# Patient Record
Sex: FEMALE | Race: WHITE | URBAN - METROPOLITAN AREA
[De-identification: names, ages, dates, MRNs, and addresses within clinical notes are randomized per-mention and may not be internally consistent; named-entity substitution may affect disease eponyms.]

---

## 2020-07-25 ENCOUNTER — TELEPHONE ENCOUNTER (OUTPATIENT)
Dept: URBAN - METROPOLITAN AREA CLINIC 13 | Facility: CLINIC | Age: 55
End: 2020-07-25

## 2020-07-26 ENCOUNTER — TELEPHONE ENCOUNTER (OUTPATIENT)
Dept: URBAN - METROPOLITAN AREA CLINIC 13 | Facility: CLINIC | Age: 55
End: 2020-07-26

## 2023-01-11 ENCOUNTER — OFFICE VISIT (OUTPATIENT)
Dept: URBAN - METROPOLITAN AREA CLINIC 113 | Facility: CLINIC | Age: 58
End: 2023-01-11
Payer: COMMERCIAL

## 2023-01-11 ENCOUNTER — LAB OUTSIDE AN ENCOUNTER (OUTPATIENT)
Dept: URBAN - METROPOLITAN AREA CLINIC 113 | Facility: CLINIC | Age: 58
End: 2023-01-11

## 2023-01-11 VITALS
SYSTOLIC BLOOD PRESSURE: 130 MMHG | RESPIRATION RATE: 18 BRPM | HEART RATE: 68 BPM | DIASTOLIC BLOOD PRESSURE: 75 MMHG | WEIGHT: 233.4 LBS | HEIGHT: 68 IN | TEMPERATURE: 97.7 F | BODY MASS INDEX: 35.37 KG/M2

## 2023-01-11 DIAGNOSIS — K21.9 GASTROESOPHAGEAL REFLUX DISEASE WITHOUT ESOPHAGITIS: ICD-10-CM

## 2023-01-11 DIAGNOSIS — Z12.11 COLON CANCER SCREENING: ICD-10-CM

## 2023-01-11 DIAGNOSIS — R10.32 LLQ ABDOMINAL PAIN: ICD-10-CM

## 2023-01-11 PROBLEM — 301716002: Status: ACTIVE | Noted: 2023-01-11

## 2023-01-11 PROCEDURE — 99204 OFFICE O/P NEW MOD 45 MIN: CPT | Performed by: INTERNAL MEDICINE

## 2023-01-11 RX ORDER — FAMOTIDINE, CALCIUM CARBONATE, AND MAGNESIUM HYDROXIDE 10; 800; 165 MG/1; MG/1; MG/1
1 TAB TABLET, CHEWABLE ORAL ONCE A DAY
Status: ACTIVE | COMMUNITY

## 2023-01-11 RX ORDER — FUROSEMIDE 20 MG/1
1 TABLET TABLET ORAL ONCE A DAY
Status: ACTIVE | COMMUNITY

## 2023-01-11 RX ORDER — ESCITALOPRAM OXALATE 10 MG/1
1.5 TABLET TABLET, FILM COATED ORAL ONCE A DAY
Status: ACTIVE | COMMUNITY

## 2023-01-11 RX ORDER — LEVOTHYROXINE SODIUM 150 UG/1
1 TABLET IN THE MORNING ON AN EMPTY STOMACH TABLET ORAL ONCE A DAY
Status: ACTIVE | COMMUNITY

## 2023-01-11 RX ORDER — ONDANSETRON 4 MG/1
1 TABLET TABLET, ORALLY DISINTEGRATING ORAL
Qty: 6 | Refills: 0 | OUTPATIENT
Start: 2023-01-11

## 2023-01-11 RX ORDER — ESOMEPRAZOLE MAGNESIUM 40 MG/1
1 CAPSULE CAPSULE, DELAYED RELEASE ORAL
Qty: 30 | Refills: 6 | OUTPATIENT

## 2023-01-11 RX ORDER — DULAGLUTIDE 0.75 MG/.5ML
AS DIRECTED INJECTION, SOLUTION SUBCUTANEOUS
Status: ACTIVE | COMMUNITY

## 2023-01-11 RX ORDER — SIMVASTATIN 40 MG
2 TABLET IN THE EVENING TABLET ORAL ONCE A DAY
Status: ACTIVE | COMMUNITY

## 2023-01-11 NOTE — PHYSICAL EXAM NECK/THYROID:
normal appearance , without tenderness upon palpation , no deformities , trachea midline , Thyroid normal size , no thyroid nodules , no masses no injection or discharge

## 2023-01-11 NOTE — HPI-TODAY'S VISIT:
This is a 57-year-old female with a history of hypertension, hyperlipidemia, hyperparathyroidism, and anxiety referred from Dr. Garcia for evaluation of abdominal pain and for colon cancer screening. She reports a 1 year history of pain located in the left lower quadrant near her groin.  She describes a cramp or pulling sensation or tightness.  It occurs at random unassociated with meals, defecation, or position.  She may wake at night with an episode of pain.  It typically lasts less than 1 minute.  It rarely occurs more than once in 24 hours.  Over time, it has improved decreasing in frequency.  Her GYN did a hysteroscopy due to abnormal uterine bleeding.  He removed polyps.  Her pain has been persistent.  She has not had abdominal imaging. She has a daily bowel movement.  She denies red blood per rectum or melena.  She has a history of acid reflux.  For the last month, she has experienced consistent nocturnal acid reflux.  On 1 occasion, she waking at night with fluid regurgitating into her throat.  She felt as though she may choke and was having difficulty breathing.  This represents an isolated event.  She does use Nexium over-the-counter in the past and will occasionally take Tums at night.  She takes Pepcid daily with Allegra recommended by her allergist for itching.  She denies dysphagia.  Since a thyroidectomy, if she gets up at night to drink liquids and tries to swallow, it occasionally "sprays out of my throat."  She denies symptoms associated with eating meals. She denies a family history of GI cancers.  She has not had a prior EGD or colonoscopy. 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

## 2023-03-02 PROBLEM — 305058001: Status: ACTIVE | Noted: 2023-01-11

## 2023-03-03 PROBLEM — 266435005: Status: ACTIVE | Noted: 2023-01-11

## 2023-03-17 ENCOUNTER — OFFICE VISIT (OUTPATIENT)
Dept: URBAN - METROPOLITAN AREA SURGERY CENTER 25 | Facility: SURGERY CENTER | Age: 58
End: 2023-03-17
Payer: COMMERCIAL

## 2023-03-17 DIAGNOSIS — K29.60 OTHER GASTRITIS WITHOUT BLEEDING: ICD-10-CM

## 2023-03-17 DIAGNOSIS — K20.90 CHRONIC ESOPHAGITIS: ICD-10-CM

## 2023-03-17 DIAGNOSIS — B96.81 H PYLORI +: ICD-10-CM

## 2023-03-17 PROCEDURE — G8907 PT DOC NO EVENTS ON DISCHARG: HCPCS | Performed by: INTERNAL MEDICINE

## 2023-03-17 PROCEDURE — 43239 EGD BIOPSY SINGLE/MULTIPLE: CPT | Performed by: INTERNAL MEDICINE

## 2023-03-17 RX ORDER — ESOMEPRAZOLE MAGNESIUM 40 MG/1
1 CAPSULE CAPSULE, DELAYED RELEASE ORAL
Qty: 30 | Refills: 6 | Status: ACTIVE | COMMUNITY

## 2023-03-17 RX ORDER — DULAGLUTIDE 0.75 MG/.5ML
AS DIRECTED INJECTION, SOLUTION SUBCUTANEOUS
Status: ACTIVE | COMMUNITY

## 2023-03-17 RX ORDER — ONDANSETRON 4 MG/1
1 TABLET TABLET, ORALLY DISINTEGRATING ORAL
Qty: 6 | Refills: 0 | Status: ACTIVE | COMMUNITY
Start: 2023-01-11

## 2023-03-17 RX ORDER — SIMVASTATIN 40 MG
2 TABLET IN THE EVENING TABLET ORAL ONCE A DAY
Status: ACTIVE | COMMUNITY

## 2023-03-17 RX ORDER — LEVOTHYROXINE SODIUM 150 UG/1
1 TABLET IN THE MORNING ON AN EMPTY STOMACH TABLET ORAL ONCE A DAY
Status: ACTIVE | COMMUNITY

## 2023-03-17 RX ORDER — FUROSEMIDE 20 MG/1
1 TABLET TABLET ORAL ONCE A DAY
Status: ACTIVE | COMMUNITY

## 2023-03-17 RX ORDER — ESCITALOPRAM OXALATE 10 MG/1
1.5 TABLET TABLET, FILM COATED ORAL ONCE A DAY
Status: ACTIVE | COMMUNITY

## 2023-03-17 RX ORDER — FAMOTIDINE, CALCIUM CARBONATE, AND MAGNESIUM HYDROXIDE 10; 800; 165 MG/1; MG/1; MG/1
1 TAB TABLET, CHEWABLE ORAL ONCE A DAY
Status: ACTIVE | COMMUNITY

## 2023-04-13 ENCOUNTER — OFFICE VISIT (OUTPATIENT)
Dept: URBAN - METROPOLITAN AREA SURGERY CENTER 25 | Facility: SURGERY CENTER | Age: 58
End: 2023-04-13
Payer: COMMERCIAL

## 2023-04-13 DIAGNOSIS — Z12.11 COLON CANCER SCREENING: ICD-10-CM

## 2023-04-13 DIAGNOSIS — D12.3 ADENOMA OF TRANSVERSE COLON: ICD-10-CM

## 2023-04-13 DIAGNOSIS — D12.4 ADENOMA OF DESCENDING COLON: ICD-10-CM

## 2023-04-13 PROCEDURE — 45385 COLONOSCOPY W/LESION REMOVAL: CPT | Performed by: INTERNAL MEDICINE

## 2023-04-13 PROCEDURE — G8907 PT DOC NO EVENTS ON DISCHARG: HCPCS | Performed by: INTERNAL MEDICINE

## 2023-04-13 RX ORDER — SIMVASTATIN 40 MG
2 TABLET IN THE EVENING TABLET ORAL ONCE A DAY
Status: ACTIVE | COMMUNITY

## 2023-04-13 RX ORDER — FAMOTIDINE, CALCIUM CARBONATE, AND MAGNESIUM HYDROXIDE 10; 800; 165 MG/1; MG/1; MG/1
1 TAB TABLET, CHEWABLE ORAL ONCE A DAY
Status: ACTIVE | COMMUNITY

## 2023-04-13 RX ORDER — ESOMEPRAZOLE MAGNESIUM 40 MG/1
1 CAPSULE CAPSULE, DELAYED RELEASE ORAL
Qty: 30 | Refills: 6 | Status: ACTIVE | COMMUNITY

## 2023-04-13 RX ORDER — FUROSEMIDE 20 MG/1
1 TABLET TABLET ORAL ONCE A DAY
Status: ACTIVE | COMMUNITY

## 2023-04-13 RX ORDER — ESCITALOPRAM OXALATE 10 MG/1
1.5 TABLET TABLET, FILM COATED ORAL ONCE A DAY
Status: ACTIVE | COMMUNITY

## 2023-04-13 RX ORDER — ONDANSETRON 4 MG/1
1 TABLET TABLET, ORALLY DISINTEGRATING ORAL
Qty: 6 | Refills: 0 | Status: ACTIVE | COMMUNITY
Start: 2023-01-11

## 2023-04-13 RX ORDER — DULAGLUTIDE 0.75 MG/.5ML
AS DIRECTED INJECTION, SOLUTION SUBCUTANEOUS
Status: ACTIVE | COMMUNITY

## 2023-04-13 RX ORDER — LEVOTHYROXINE SODIUM 150 UG/1
1 TABLET IN THE MORNING ON AN EMPTY STOMACH TABLET ORAL ONCE A DAY
Status: ACTIVE | COMMUNITY

## 2023-04-14 ENCOUNTER — TELEPHONE ENCOUNTER (OUTPATIENT)
Dept: URBAN - METROPOLITAN AREA CLINIC 113 | Facility: CLINIC | Age: 58
End: 2023-04-14

## 2023-04-14 RX ORDER — METRONIDAZOLE 500 MG/1
1 TABLET TABLET ORAL TWICE A DAY
Qty: 28 TABLET | Refills: 0 | OUTPATIENT
Start: 2023-04-14 | End: 2023-04-28

## 2023-04-14 RX ORDER — PANTOPRAZOLE SODIUM 40 MG/1
1 TABLET TABLET, DELAYED RELEASE ORAL TWICE A DAY
Qty: 28 TABLET | Refills: 0 | OUTPATIENT
Start: 2023-04-14

## 2023-04-14 RX ORDER — BISMUTH SUBSALICYLATE 262 MG/1
1 TABLET TABLET, CHEWABLE ORAL TWICE A DAY
Qty: 28 TABLET | Refills: 0 | OUTPATIENT
Start: 2023-04-14 | End: 2023-04-28

## 2023-05-31 ENCOUNTER — DASHBOARD ENCOUNTERS (OUTPATIENT)
Age: 58
End: 2023-05-31

## 2023-05-31 ENCOUNTER — OFFICE VISIT (OUTPATIENT)
Dept: URBAN - METROPOLITAN AREA CLINIC 113 | Facility: CLINIC | Age: 58
End: 2023-05-31
Payer: COMMERCIAL

## 2023-05-31 VITALS
WEIGHT: 230 LBS | DIASTOLIC BLOOD PRESSURE: 85 MMHG | SYSTOLIC BLOOD PRESSURE: 131 MMHG | HEART RATE: 65 BPM | RESPIRATION RATE: 20 BRPM | BODY MASS INDEX: 34.86 KG/M2 | HEIGHT: 68 IN | TEMPERATURE: 97.5 F

## 2023-05-31 DIAGNOSIS — K21.9 GASTROESOPHAGEAL REFLUX DISEASE WITHOUT ESOPHAGITIS: ICD-10-CM

## 2023-05-31 DIAGNOSIS — A04.8 H. PYLORI INFECTION: ICD-10-CM

## 2023-05-31 DIAGNOSIS — Z86.010 HISTORY OF ADENOMATOUS POLYP OF COLON: ICD-10-CM

## 2023-05-31 DIAGNOSIS — K57.30 COLON, DIVERTICULOSIS: ICD-10-CM

## 2023-05-31 PROBLEM — 733657002: Status: ACTIVE | Noted: 2023-05-31

## 2023-05-31 PROBLEM — 429047008: Status: ACTIVE | Noted: 2023-05-31

## 2023-05-31 PROCEDURE — 99214 OFFICE O/P EST MOD 30 MIN: CPT | Performed by: INTERNAL MEDICINE

## 2023-05-31 RX ORDER — SIMVASTATIN 80 MG/1
2 TABLET IN THE EVENING TABLET, FILM COATED ORAL ONCE A DAY
Status: ACTIVE | COMMUNITY

## 2023-05-31 RX ORDER — LEVOTHYROXINE SODIUM 150 UG/1
1 TABLET IN THE MORNING ON AN EMPTY STOMACH TABLET ORAL ONCE A DAY
Status: ACTIVE | COMMUNITY

## 2023-05-31 RX ORDER — ESCITALOPRAM OXALATE 10 MG/1
1.5 TABLET TABLET, FILM COATED ORAL ONCE A DAY
Status: ACTIVE | COMMUNITY

## 2023-05-31 RX ORDER — FAMOTIDINE, CALCIUM CARBONATE, AND MAGNESIUM HYDROXIDE 10; 800; 165 MG/1; MG/1; MG/1
1 TAB TABLET, CHEWABLE ORAL ONCE A DAY
Status: ACTIVE | COMMUNITY

## 2023-05-31 RX ORDER — ESOMEPRAZOLE MAGNESIUM 40 MG/1
1 CAPSULE CAPSULE, DELAYED RELEASE ORAL
Qty: 30 | Refills: 6 | Status: ACTIVE | COMMUNITY

## 2023-05-31 RX ORDER — ESOMEPRAZOLE MAGNESIUM 40 MG/1
1 CAPSULE CAPSULE, DELAYED RELEASE ORAL
Qty: 30 | Refills: 11

## 2023-05-31 RX ORDER — ONDANSETRON 4 MG/1
1 TABLET TABLET, ORALLY DISINTEGRATING ORAL
Qty: 6 | Refills: 0 | Status: ACTIVE | COMMUNITY
Start: 2023-01-11

## 2023-05-31 RX ORDER — FUROSEMIDE 20 MG/1
1 TABLET TABLET ORAL ONCE A DAY
Status: ACTIVE | COMMUNITY

## 2023-05-31 RX ORDER — DULAGLUTIDE 0.75 MG/.5ML
AS DIRECTED INJECTION, SOLUTION SUBCUTANEOUS
Status: ON HOLD | COMMUNITY

## 2023-05-31 NOTE — HPI-OTHER HISTORIES
Colonoscopy 4/13/2023:BBPS 9 (MiraLAX), sigmoid and descending diverticulosis, removal of 3 descending and transverse 3 to 6 mm polyps, mild grade 1 nonbleeding internal hemorrhoids.  Pathology: Transverse and descending polyps were tubular adenomas.  Surveillance recommended in 2028. recall set EGD 3/17/2023:Irregular Z-line status post biopsy, medium size hiatal hernia, chronic gastritis status post biopsy, normal examined duodenum.  Pathology: Antral biopsies demonstrated chronic gastritis, moderate.  Positive for H. pylori.  Negative for intestinal metaplasia and dysplasia.  GE junction biopsy demonstrated columnar mucosa with chronic inflammation.  Negative for Castillo's esophagus (intestinal metaplasia), fungi, eosinophilic esophagitis, viral inclusions, and dysplasia.

## 2023-05-31 NOTE — HPI-TODAY'S VISIT:
This is a 57-year-old female with a history of hypertension, hyperlipidemia, hyperparathyroidism, anxiety, left lower quadrant abdominal pain, she was initially seen at 1/11/2023.  And GERD presenting for follow-up. She reported a history of nocturnal reflux that have been worsening since before Christmas.  She was having consistent nocturnal symptoms.  Discussed lifestyle modification for GERD.  Recommended esomeprazole 40 mg prior to supper.  An EGD was scheduled.  She reported random, fleeting, isolated episodes of left lower quadrant abdominal pain.  It was suspected this may be musculoskeletal in origin.  Discussed a CT scan to assess for intra-abdominal pathology.  Because her symptoms were improving, she declined but would consider it in the future if symptoms worsened.  She was at average risk for colon cancer and was due screening.  This was scheduled. Colonoscopy 4/13/2023:BBPS 9 (MiraLAX), sigmoid and descending diverticulosis, removal of 3 descending and transverse 3 to 6 mm polyps, mild grade 1 nonbleeding internal hemorrhoids.  Pathology: Transverse and descending polyps were tubular adenomas.  Surveillance recommended in 2028. EGD 3/17/2023:Irregular Z-line status post biopsy, medium size hiatal hernia, chronic gastritis status post biopsy, normal examined duodenum.  Pathology: Antral biopsies demonstrated chronic gastritis, moderate.  Positive for H. pylori.  Negative for intestinal metaplasia and dysplasia.  GE junction biopsy demonstrated columnar mucosa with chronic inflammation.  Negative for Castillo's esophagus (intestinal metaplasia), fungi, eosinophilic esophagitis, viral inclusions, and dysplasia. On 4/14/2023, she was prescribed metronidazole 500 mg twice a day, pantoprazole 40 mg twice a day, and bismuth 262 mg twice a day all for 14 days. She is taking esomeprazole 40 mg at bedtime.  She is attempting to eat early.  She has no acid reflux symptoms during the day.  She has regurgitation at night that may occur once a week or 2 or 3 nights in a row.  She has been taking Pepcid Complete which is somewhat helpful.  She denies dysphagia, nausea, change in bowel habits, or any other abdominal symptoms.